# Patient Record
(demographics unavailable — no encounter records)

---

## 2025-02-04 NOTE — REASON FOR VISIT
[Initial - Scheduled] : an initial, scheduled visit with concerns of [Patient] : patient [Parents] : parents [FreeTextEntry3] : Hernia [FreeTextEntry4] : Nav Jaramillo MD

## 2025-02-04 NOTE — HISTORY OF PRESENT ILLNESS
[FreeTextEntry1] : Krystin is an otherwise healthy 8 year old girl who is here today to be evaluated for an umbilical hernia.  She noticed that after coughing a lot with the flu a couple of weeks ago and her pediatrician noted that she had an umbilical hernia.  In hindsight she has had it since birth.  No history of incarceration or pain.  No skin changes she has come here today for surgical evaluation

## 2025-02-04 NOTE — PHYSICAL EXAM
[Regular heart rate and rhythm] : regular heart rate and rhythm [NL] : grossly intact [TextBox_37] : Umbilical hernia with half centimeter defect and small amount of excess skin

## 2025-02-04 NOTE — ASSESSMENT
[FreeTextEntry1] : 8-year-old girl with an umbilical hernia.  I am scheduling her for an umbilical hernia repair.  The family understands the risks of surgery including bleeding infection and rarely an injury to adjacent structures.  They agree to proceed

## 2025-02-04 NOTE — CONSULT LETTER
[Dear  ___] : Dear  [unfilled], [FreeTextEntry3] : Dayday Fong MD  Director, Surgical Research  Division of Pediatric, General, Thoracic and Endoscopic Surgery  [facility]

## 2025-04-03 NOTE — ASSESSMENT
[FreeTextEntry1] : Krystin is an 8 year old female who is now two weeks out from her umbilical hernia repair. She is doing well postoperatively and remains completely asymptomatic. I counseled Krystin and her mother and reassured them that the surgical site appears to be healing nicely without evidence of active infection or hernia recurrence. We reviewed postoperative expectations and Krystin is cleared to resume her normal activities starting tomorrow. The family verbalized their understanding and we have agreed to follow up on an as-needed basis moving forward. They have my information and know to contact me with any questions or concerns.

## 2025-04-03 NOTE — REASON FOR VISIT
[Mother] : mother [Umbilical hernia repair] : umbilical hernia repair [Patient] : patient [____ Week(s)] : [unfilled] week(s)  [de-identified] : 3/21/25 [de-identified] :  [de-identified] : Krystin is an 8 year old girl who underwent an umbilical hernia repair on 3/21/25. She is doing well overall. She had one day of abdominal pain which is now resolved. She is tolerating her meals well and has normal daily bowel movements. No issues with wound healing. No signs of infection reported. She remains afebrile.

## 2025-04-03 NOTE — ADDENDUM
[FreeTextEntry1] : Documented by Dequan Abbott acting as a scribe for Dr. Fong on 04/03/2025.   All medical record entries made by the Scribe were at my, Dr. Fong, direction and personally dictated by me on 04/03/2025. I have reviewed the chart and agree that the record accurately reflects my personal performances of the history, physical exam, assessment and plan. I have also personally directed, reviewed, and agree with the instructions.

## 2025-04-03 NOTE — ADDENDUM
[FreeTextEntry1] : Documented by Dequan Abbott acting as a scribe for Dr. Fong on 04/03/2025.   All medical record entries made by the Scribe were at my, Dr. Fong, direction and personally dictated by me on 04/03/2025. I have reviewed the chart and agree that the record accurately reflects my personal performances of the history, physical exam, assessment and plan. I have also personally directed, reviewed, and agree with the instructions. Transporter

## 2025-04-03 NOTE — REASON FOR VISIT
[Mother] : mother [Umbilical hernia repair] : umbilical hernia repair [Patient] : patient [____ Week(s)] : [unfilled] week(s)  [de-identified] : 3/21/25 [de-identified] :  [de-identified] : Krystin is an 8 year old girl who underwent an umbilical hernia repair on 3/21/25. She is doing well overall. She had one day of abdominal pain which is now resolved. She is tolerating her meals well and has normal daily bowel movements. No issues with wound healing. No signs of infection reported. She remains afebrile.

## 2025-04-03 NOTE — CONSULT LETTER
[Dear  ___] : Dear  [unfilled], [Consult Letter:] : I had the pleasure of evaluating your patient, [unfilled]. [Please see my note below.] : Please see my note below. [Consult Closing:] : Thank you very much for allowing me to participate in the care of this patient.  If you have any questions, please do not hesitate to contact me. [Sincerely,] : Sincerely, [FreeTextEntry2] : Nav Jaramillo MD [FreeTextEntry3] : Dayday Fong MD  Director, Surgical Research  Division of Pediatric, General, Thoracic and Endoscopic Surgery  [facility]

## 2025-04-03 NOTE — CONSULT LETTER
[Dear  ___] : Dear  [unfilled], [Consult Letter:] : I had the pleasure of evaluating your patient, [unfilled]. [Please see my note below.] : Please see my note below. [Consult Closing:] : Thank you very much for allowing me to participate in the care of this patient.  If you have any questions, please do not hesitate to contact me. [Sincerely,] : Sincerely, [FreeTextEntry2] : Nav Jaramillo MD [FreeTextEntry3] : Dayday Fong MD  Director, Surgical Research  Division of Pediatric, General, Thoracic and Endoscopic Surgery  [facility]